# Patient Record
Sex: MALE | Employment: FULL TIME | URBAN - METROPOLITAN AREA
[De-identification: names, ages, dates, MRNs, and addresses within clinical notes are randomized per-mention and may not be internally consistent; named-entity substitution may affect disease eponyms.]

---

## 2023-04-20 ENCOUNTER — OFFICE VISIT (OUTPATIENT)
Dept: SURGERY | Age: 48
End: 2023-04-20

## 2023-04-20 VITALS
DIASTOLIC BLOOD PRESSURE: 76 MMHG | WEIGHT: 180 LBS | BODY MASS INDEX: 23.86 KG/M2 | SYSTOLIC BLOOD PRESSURE: 126 MMHG | HEART RATE: 78 BPM | RESPIRATION RATE: 16 BRPM | OXYGEN SATURATION: 99 % | TEMPERATURE: 98 F | HEIGHT: 73 IN

## 2023-04-20 DIAGNOSIS — Z76.89 ENCOUNTER TO ESTABLISH CARE: Primary | ICD-10-CM

## 2023-04-20 DIAGNOSIS — K64.8 BLEEDING INTERNAL HEMORRHOIDS: ICD-10-CM

## 2023-04-20 LAB
ALBUMIN SERPL-MCNC: 4.7 G/DL (ref 3.4–5)
ALBUMIN/GLOB SERPL: 2.1 {RATIO} (ref 1.1–2.2)
ALP SERPL-CCNC: 75 U/L (ref 40–129)
ALT SERPL-CCNC: 10 U/L (ref 10–40)
ANION GAP SERPL CALCULATED.3IONS-SCNC: 12 MMOL/L (ref 3–16)
AST SERPL-CCNC: 14 U/L (ref 15–37)
BILIRUB SERPL-MCNC: 0.5 MG/DL (ref 0–1)
BUN SERPL-MCNC: 9 MG/DL (ref 7–20)
CALCIUM SERPL-MCNC: 9.9 MG/DL (ref 8.3–10.6)
CHLORIDE SERPL-SCNC: 100 MMOL/L (ref 99–110)
CO2 SERPL-SCNC: 26 MMOL/L (ref 21–32)
CREAT SERPL-MCNC: 0.9 MG/DL (ref 0.9–1.3)
DEPRECATED RDW RBC AUTO: 12.9 % (ref 12.4–15.4)
GFR SERPLBLD CREATININE-BSD FMLA CKD-EPI: >60 ML/MIN/{1.73_M2}
GLUCOSE SERPL-MCNC: 77 MG/DL (ref 70–99)
HCT VFR BLD AUTO: 42.6 % (ref 40.5–52.5)
HGB BLD-MCNC: 14.5 G/DL (ref 13.5–17.5)
MCH RBC QN AUTO: 32.7 PG (ref 26–34)
MCHC RBC AUTO-ENTMCNC: 34.1 G/DL (ref 31–36)
MCV RBC AUTO: 96 FL (ref 80–100)
PLATELET # BLD AUTO: 258 K/UL (ref 135–450)
PMV BLD AUTO: 7.8 FL (ref 5–10.5)
POTASSIUM SERPL-SCNC: 4.8 MMOL/L (ref 3.5–5.1)
PROT SERPL-MCNC: 6.9 G/DL (ref 6.4–8.2)
RBC # BLD AUTO: 4.43 M/UL (ref 4.2–5.9)
SODIUM SERPL-SCNC: 138 MMOL/L (ref 136–145)
WBC # BLD AUTO: 4.2 K/UL (ref 4–11)

## 2023-04-20 NOTE — PROGRESS NOTES
Subjective:     Patient is a 49 y.o. man with rectal bleeding and prolapse    HPI: Mr. Antwan Drummond reports a several year history of sensation of prolapse. He notices this more with long walks but it has worsened over the years. It occurs with BM. He recently returned to the [address] [place] [address] after [place] time abroad [place] The humid weather seemed to make it worse. He denies hyper-flexibility or any connective tissue disorder. He is otherwise healthy. No Known Allergies   Social History     Tobacco Use    Smoking status: Former     Types: Cigarettes    Smokeless tobacco: Former     Types: Chew    Tobacco comments:     Was an on and off smoker for about 10 years   Substance Use Topics    Alcohol use: Yes     Alcohol/week: 10.0 standard drinks     Types: 10 Standard drinks or equivalent per week     ROS: GI: + bleeding     FH: No FH of colon cancer or Crohn's    Objective:     GEN: appears well, no distress, appears stated age  PSYCH: normal mood, normal affect  NECK: no neck masses, trachea midline  ENT: moist oral mucosa; anicteric  SKIN: no rash or jaundice  CV: regular heart rate and rhythm  PULM: normal respiratory effort, no wheezing  GI: soft non tender abdomen. Normal bowel sounds  RECTAL: examined with chaperone Kulwant Kaur RN. Irregular area with firm nodule of posterior anal skin. There is a small amount of granulation here consistent with subcutaneous fistula. There is a large right sided internal hemorrhoid. As he bears down this hemorrhoid prolapses  EXT/NEURO: normal gait, strength/sensation grossly intact in all extremities      Assessment:     Hemorrhoids - discussed based on exam this seems to be hemorrhoidal prolapse. Discussed true rectal prolapse would be unusual at his age. Fistula    Colon cancer screen     Plan:     The risks, benefits, and alternatives of colonoscopy were discussed. The risks include bleeding and the very low risk of perforation.  May need additional operation/treatment based

## 2023-04-21 ENCOUNTER — TELEPHONE (OUTPATIENT)
Dept: SURGERY | Age: 48
End: 2023-04-21

## 2023-04-21 RX ORDER — POLYETHYLENE GLYCOL 3350, SODIUM CHLORIDE, SODIUM BICARBONATE, POTASSIUM CHLORIDE 420; 11.2; 5.72; 1.48 G/4L; G/4L; G/4L; G/4L
POWDER, FOR SOLUTION ORAL
Qty: 1 EACH | Refills: 0 | Status: SHIPPED | OUTPATIENT
Start: 2023-04-21

## 2023-04-21 NOTE — TELEPHONE ENCOUNTER
Patient has been scheduled for:    Procedure: Fistulotomy, colonoscopy, RBL  Date: 5/23/23  Time: 9:00AM  Arrival: 7:00AM  [facility]  [facility]?: none  Prep? Golytely, discussed in office, sent to e-mail    Pre-op? Post-op Appt? Patient advised they will need a . Orders routed to surgery scheduling. Instructions have been mailed/emailed to:  [email or web address]      Prep meds routed to  for approval

## 2023-05-10 ENCOUNTER — TELEPHONE (OUTPATIENT)
Dept: SURGERY | Age: 48
End: 2023-05-10

## 2023-05-10 NOTE — TELEPHONE ENCOUNTER
Patient called in to find out if authorization was obtained for his upcoming procedure     Patient would also like to discuss his procedure with the clinical team    Please contact the patient at [phone]

## 2023-05-22 ENCOUNTER — TELEPHONE (OUTPATIENT)
Dept: SURGERY | Age: 48
End: 2023-05-22

## 2023-05-22 ENCOUNTER — ANESTHESIA EVENT (OUTPATIENT)
Dept: OPERATING ROOM | Age: 48
End: 2023-05-22
Payer: COMMERCIAL

## 2023-05-22 NOTE — TELEPHONE ENCOUNTER
I have placed a reminder call to patient for upcoming procedure. Did you speak directly to patient or leave a voicemail? Spoke to patient    Prep? Golytely    Must have a  that is over the age of 25. Must be a friend or family member that can be responsible for signing them out after surgery.     Arrive at the main entrance of [facility] at [employer]

## 2023-05-23 ENCOUNTER — ANESTHESIA (OUTPATIENT)
Dept: OPERATING ROOM | Age: 48
End: 2023-05-23
Payer: COMMERCIAL

## 2023-05-23 ENCOUNTER — HOSPITAL ENCOUNTER (OUTPATIENT)
Age: 48
Setting detail: OUTPATIENT SURGERY
Discharge: HOME | End: 2023-05-23
Attending: SURGERY | Admitting: SURGERY
Payer: COMMERCIAL

## 2023-05-23 VITALS
HEIGHT: 73 IN | BODY MASS INDEX: 24.01 KG/M2 | WEIGHT: 181.2 LBS | DIASTOLIC BLOOD PRESSURE: 84 MMHG | RESPIRATION RATE: 16 BRPM | TEMPERATURE: 97.3 F | SYSTOLIC BLOOD PRESSURE: 102 MMHG | OXYGEN SATURATION: 97 % | HEART RATE: 62 BPM

## 2023-05-23 DIAGNOSIS — K60.3 FISTULA-IN-ANO: ICD-10-CM

## 2023-05-23 DIAGNOSIS — G89.18 ACUTE POST-OPERATIVE PAIN: Primary | ICD-10-CM

## 2023-05-23 DIAGNOSIS — Z12.11 COLON CANCER SCREENING: ICD-10-CM

## 2023-05-23 DIAGNOSIS — K64.8 OTHER HEMORRHOIDS: ICD-10-CM

## 2023-05-23 PROBLEM — K64.2 GRADE III HEMORRHOIDS: Status: ACTIVE | Noted: 2023-05-23

## 2023-05-23 PROCEDURE — 6360000002 HC RX W HCPCS: Performed by: SURGERY

## 2023-05-23 PROCEDURE — 3600000014 HC SURGERY LEVEL 4 ADDTL 15MIN: Performed by: SURGERY

## 2023-05-23 PROCEDURE — 2580000003 HC RX 258: Performed by: FAMILY MEDICINE

## 2023-05-23 PROCEDURE — 7100000010 HC PHASE II RECOVERY - FIRST 15 MIN: Performed by: SURGERY

## 2023-05-23 PROCEDURE — 2580000003 HC RX 258

## 2023-05-23 PROCEDURE — 6360000002 HC RX W HCPCS

## 2023-05-23 PROCEDURE — 2580000003 HC RX 258: Performed by: SURGERY

## 2023-05-23 PROCEDURE — 7100000000 HC PACU RECOVERY - FIRST 15 MIN: Performed by: SURGERY

## 2023-05-23 PROCEDURE — 7100000001 HC PACU RECOVERY - ADDTL 15 MIN: Performed by: SURGERY

## 2023-05-23 PROCEDURE — 3700000000 HC ANESTHESIA ATTENDED CARE: Performed by: SURGERY

## 2023-05-23 PROCEDURE — A4217 STERILE WATER/SALINE, 500 ML: HCPCS | Performed by: SURGERY

## 2023-05-23 PROCEDURE — 7100000011 HC PHASE II RECOVERY - ADDTL 15 MIN: Performed by: SURGERY

## 2023-05-23 PROCEDURE — 3600000004 HC SURGERY LEVEL 4 BASE: Performed by: SURGERY

## 2023-05-23 PROCEDURE — 3700000001 HC ADD 15 MINUTES (ANESTHESIA): Performed by: SURGERY

## 2023-05-23 PROCEDURE — 2500000003 HC RX 250 WO HCPCS

## 2023-05-23 PROCEDURE — C9290 INJ, BUPIVACAINE LIPOSOME: HCPCS | Performed by: SURGERY

## 2023-05-23 PROCEDURE — 2709999900 HC NON-CHARGEABLE SUPPLY: Performed by: SURGERY

## 2023-05-23 RX ORDER — PROCHLORPERAZINE EDISYLATE 5 MG/ML
5 INJECTION INTRAMUSCULAR; INTRAVENOUS
Status: DISCONTINUED | OUTPATIENT
Start: 2023-05-23 | End: 2023-05-23 | Stop reason: HOSPADM

## 2023-05-23 RX ORDER — MIDAZOLAM HYDROCHLORIDE 1 MG/ML
INJECTION INTRAMUSCULAR; INTRAVENOUS PRN
Status: DISCONTINUED | OUTPATIENT
Start: 2023-05-23 | End: 2023-05-23 | Stop reason: SDUPTHER

## 2023-05-23 RX ORDER — SODIUM CHLORIDE 0.9 % (FLUSH) 0.9 %
5-40 SYRINGE (ML) INJECTION PRN
Status: DISCONTINUED | OUTPATIENT
Start: 2023-05-23 | End: 2023-05-23 | Stop reason: HOSPADM

## 2023-05-23 RX ORDER — OXYCODONE HYDROCHLORIDE 5 MG/1
5 TABLET ORAL EVERY 6 HOURS PRN
Qty: 28 TABLET | Refills: 0 | Status: SHIPPED | OUTPATIENT
Start: 2023-05-23 | End: 2023-05-30

## 2023-05-23 RX ORDER — PROPOFOL 10 MG/ML
INJECTION, EMULSION INTRAVENOUS PRN
Status: DISCONTINUED | OUTPATIENT
Start: 2023-05-23 | End: 2023-05-23 | Stop reason: SDUPTHER

## 2023-05-23 RX ORDER — LORAZEPAM 2 MG/ML
0.5 INJECTION INTRAMUSCULAR
Status: DISCONTINUED | OUTPATIENT
Start: 2023-05-23 | End: 2023-05-23 | Stop reason: HOSPADM

## 2023-05-23 RX ORDER — MEPERIDINE HYDROCHLORIDE 25 MG/ML
12.5 INJECTION INTRAMUSCULAR; INTRAVENOUS; SUBCUTANEOUS EVERY 5 MIN PRN
Status: DISCONTINUED | OUTPATIENT
Start: 2023-05-23 | End: 2023-05-23 | Stop reason: HOSPADM

## 2023-05-23 RX ORDER — ONDANSETRON 2 MG/ML
4 INJECTION INTRAMUSCULAR; INTRAVENOUS
Status: DISCONTINUED | OUTPATIENT
Start: 2023-05-23 | End: 2023-05-23 | Stop reason: HOSPADM

## 2023-05-23 RX ORDER — FENTANYL CITRATE 50 UG/ML
25 INJECTION, SOLUTION INTRAMUSCULAR; INTRAVENOUS EVERY 5 MIN PRN
Status: DISCONTINUED | OUTPATIENT
Start: 2023-05-23 | End: 2023-05-23 | Stop reason: HOSPADM

## 2023-05-23 RX ORDER — ACETAMINOPHEN 650 MG/1
650 SUPPOSITORY RECTAL
Status: DISCONTINUED | OUTPATIENT
Start: 2023-05-23 | End: 2023-05-23 | Stop reason: HOSPADM

## 2023-05-23 RX ORDER — SODIUM CHLORIDE 0.9 % (FLUSH) 0.9 %
5-40 SYRINGE (ML) INJECTION EVERY 12 HOURS SCHEDULED
Status: DISCONTINUED | OUTPATIENT
Start: 2023-05-23 | End: 2023-05-23 | Stop reason: HOSPADM

## 2023-05-23 RX ORDER — SUCCINYLCHOLINE/SOD CL,ISO/PF 200MG/10ML
SYRINGE (ML) INTRAVENOUS PRN
Status: DISCONTINUED | OUTPATIENT
Start: 2023-05-23 | End: 2023-05-23 | Stop reason: SDUPTHER

## 2023-05-23 RX ORDER — LIDOCAINE HYDROCHLORIDE 20 MG/ML
INJECTION, SOLUTION INTRAVENOUS PRN
Status: DISCONTINUED | OUTPATIENT
Start: 2023-05-23 | End: 2023-05-23 | Stop reason: SDUPTHER

## 2023-05-23 RX ORDER — SODIUM CHLORIDE, SODIUM LACTATE, POTASSIUM CHLORIDE, CALCIUM CHLORIDE 600; 310; 30; 20 MG/100ML; MG/100ML; MG/100ML; MG/100ML
INJECTION, SOLUTION INTRAVENOUS CONTINUOUS
Status: DISCONTINUED | OUTPATIENT
Start: 2023-05-23 | End: 2023-05-23 | Stop reason: HOSPADM

## 2023-05-23 RX ORDER — DEXAMETHASONE SODIUM PHOSPHATE 4 MG/ML
INJECTION, SOLUTION INTRA-ARTICULAR; INTRALESIONAL; INTRAMUSCULAR; INTRAVENOUS; SOFT TISSUE PRN
Status: DISCONTINUED | OUTPATIENT
Start: 2023-05-23 | End: 2023-05-23

## 2023-05-23 RX ORDER — SODIUM CHLORIDE 9 MG/ML
25 INJECTION, SOLUTION INTRAVENOUS PRN
Status: DISCONTINUED | OUTPATIENT
Start: 2023-05-23 | End: 2023-05-23 | Stop reason: HOSPADM

## 2023-05-23 RX ORDER — IPRATROPIUM BROMIDE AND ALBUTEROL SULFATE 2.5; .5 MG/3ML; MG/3ML
1 SOLUTION RESPIRATORY (INHALATION)
Status: DISCONTINUED | OUTPATIENT
Start: 2023-05-23 | End: 2023-05-23 | Stop reason: HOSPADM

## 2023-05-23 RX ORDER — GLYCOPYRROLATE 0.2 MG/ML
INJECTION INTRAMUSCULAR; INTRAVENOUS PRN
Status: DISCONTINUED | OUTPATIENT
Start: 2023-05-23 | End: 2023-05-23 | Stop reason: SDUPTHER

## 2023-05-23 RX ORDER — EPHEDRINE SULFATE 50 MG/ML
INJECTION INTRAVENOUS PRN
Status: DISCONTINUED | OUTPATIENT
Start: 2023-05-23 | End: 2023-05-23 | Stop reason: SDUPTHER

## 2023-05-23 RX ORDER — LABETALOL HYDROCHLORIDE 5 MG/ML
10 INJECTION, SOLUTION INTRAVENOUS
Status: DISCONTINUED | OUTPATIENT
Start: 2023-05-23 | End: 2023-05-23 | Stop reason: HOSPADM

## 2023-05-23 RX ORDER — DOCUSATE SODIUM 100 MG/1
100 CAPSULE, LIQUID FILLED ORAL 2 TIMES DAILY PRN
Qty: 30 CAPSULE | Refills: 0 | Status: SHIPPED | OUTPATIENT
Start: 2023-05-23

## 2023-05-23 RX ORDER — MAGNESIUM HYDROXIDE 1200 MG/15ML
LIQUID ORAL CONTINUOUS PRN
Status: DISCONTINUED | OUTPATIENT
Start: 2023-05-23 | End: 2023-05-23 | Stop reason: HOSPADM

## 2023-05-23 RX ORDER — FENTANYL CITRATE 50 UG/ML
INJECTION, SOLUTION INTRAMUSCULAR; INTRAVENOUS PRN
Status: DISCONTINUED | OUTPATIENT
Start: 2023-05-23 | End: 2023-05-23 | Stop reason: SDUPTHER

## 2023-05-23 RX ORDER — ONDANSETRON 2 MG/ML
INJECTION INTRAMUSCULAR; INTRAVENOUS PRN
Status: DISCONTINUED | OUTPATIENT
Start: 2023-05-23 | End: 2023-05-23 | Stop reason: SDUPTHER

## 2023-05-23 RX ORDER — SODIUM CHLORIDE, SODIUM LACTATE, POTASSIUM CHLORIDE, CALCIUM CHLORIDE 600; 310; 30; 20 MG/100ML; MG/100ML; MG/100ML; MG/100ML
INJECTION, SOLUTION INTRAVENOUS CONTINUOUS PRN
Status: DISCONTINUED | OUTPATIENT
Start: 2023-05-23 | End: 2023-05-23 | Stop reason: SDUPTHER

## 2023-05-23 RX ADMIN — SODIUM CHLORIDE, SODIUM LACTATE, POTASSIUM CHLORIDE, AND CALCIUM CHLORIDE: .6; .31; .03; .02 INJECTION, SOLUTION INTRAVENOUS at 09:14

## 2023-05-23 RX ADMIN — FENTANYL CITRATE 50 MCG: 50 INJECTION, SOLUTION INTRAMUSCULAR; INTRAVENOUS at 09:20

## 2023-05-23 RX ADMIN — EPHEDRINE SULFATE 10 MG: 50 INJECTION INTRAVENOUS at 09:40

## 2023-05-23 RX ADMIN — SODIUM CHLORIDE, POTASSIUM CHLORIDE, SODIUM LACTATE AND CALCIUM CHLORIDE: 600; 310; 30; 20 INJECTION, SOLUTION INTRAVENOUS at 08:45

## 2023-05-23 RX ADMIN — SODIUM CHLORIDE 2000 MG: 900 INJECTION INTRAVENOUS at 09:22

## 2023-05-23 RX ADMIN — LIDOCAINE HYDROCHLORIDE 80 MG: 20 INJECTION, SOLUTION INTRAVENOUS at 09:20

## 2023-05-23 RX ADMIN — MIDAZOLAM HYDROCHLORIDE 2 MG: 2 INJECTION, SOLUTION INTRAMUSCULAR; INTRAVENOUS at 09:12

## 2023-05-23 RX ADMIN — PROPOFOL 200 MG: 10 INJECTION, EMULSION INTRAVENOUS at 09:20

## 2023-05-23 RX ADMIN — ONDANSETRON 4 MG: 2 INJECTION INTRAMUSCULAR; INTRAVENOUS at 10:22

## 2023-05-23 RX ADMIN — SODIUM CHLORIDE, SODIUM LACTATE, POTASSIUM CHLORIDE, AND CALCIUM CHLORIDE: .6; .31; .03; .02 INJECTION, SOLUTION INTRAVENOUS at 10:03

## 2023-05-23 RX ADMIN — FENTANYL CITRATE 50 MCG: 50 INJECTION, SOLUTION INTRAMUSCULAR; INTRAVENOUS at 10:16

## 2023-05-23 RX ADMIN — Medication 140 MG: at 09:20

## 2023-05-23 RX ADMIN — EPHEDRINE SULFATE 10 MG: 50 INJECTION INTRAVENOUS at 09:59

## 2023-05-23 RX ADMIN — GLYCOPYRROLATE 0.2 MG: 0.2 INJECTION INTRAMUSCULAR; INTRAVENOUS at 09:36

## 2023-05-23 ASSESSMENT — PAIN SCALES - GENERAL
PAINLEVEL_OUTOF10: 0

## 2023-05-23 ASSESSMENT — PAIN - FUNCTIONAL ASSESSMENT: PAIN_FUNCTIONAL_ASSESSMENT: 0-10

## 2023-05-23 NOTE — PROGRESS NOTES
PACU Transfer to [facility]    Vitals:    05/23/23 1130   BP: 110/79   Pulse: 60   Resp: 12   Temp: 97.1 °F (36.2 °C)   SpO2: 98%         Intake/Output Summary (Last 24 hours) at 5/23/2023 1137  Last data filed at 5/23/2023 1130  Gross per 24 hour   Intake 1550 ml   Output --   Net 1550 ml       Pain assessment:    Pain Level: 0    Patient transferred to care of [facility] RN.    5/23/2023 11:37 AM

## 2023-05-23 NOTE — H&P
Subjective:     Patient is a 49 y.o. man with hemorrhoids, colon screening     HPI: No acute changes since last seen. Still having some prolapse sensation and anal irritation     There are no problems to display for this patient. History reviewed. No pertinent past medical history. History reviewed. No pertinent surgical history. Medications Prior to Admission: polyethylene glycol-electrolytes (NULYTELY) 420 g solution, TAKE AS DIRECTED DAY PRIOR TO COLONOSCOPY  No Known Allergies   Social History     Tobacco Use    Smoking status: Former     Types: Cigarettes    Smokeless tobacco: Former     Types: Chew    Tobacco comments:     Was an on and off smoker for about 10 years   Substance Use Topics    Alcohol use: Yes     Alcohol/week: 10.0 standard drinks     Types: 10 Standard drinks or equivalent per week     Comment: weekends          Objective:     Patient Vitals for the past 8 hrs:   BP Temp Temp src Pulse Resp SpO2 Height Weight   05/23/23 0814 111/74 97.7 °F (36.5 °C) Oral 68 18 99 % 6' 1\" (1.854 m) 181 lb 3.2 oz (82.2 kg)     GEN: appears well, no distress, appears stated age  PSYCH: normal mood, normal affect  NECK: no neck masses, trachea midline  ENT: moist oral mucosa; anicteric  SKIN: no rash or jaundice  CV: regular heart rate and rhythm  PULM: normal respiratory effort, no wheezing  GI: soft non tender abdomen. Normal bowel sounds  RECTAL: deferred;  EXT/NEURO: normal gait, strength/sensation grossly intact in all extremities      Assessment:     Hemorrhoids  Fistula  Colon screen     Plan:     RBA of colonoscopy, hemorrhoid banding, fistula surgery reviewed.  Patient elects to proceed     Lynda Elliott M.D.  5/23/23   8:32 AM

## 2023-05-23 NOTE — ANESTHESIA PRE PROCEDURE
Department of Anesthesiology  Preprocedure Note       Name:  Veronica Rangel   Age:  49 y.o.  :  1975                                          MRN:  [id number]         Date:  2023      Surgeon: Prince Astudillo):  Ai Rodrigez MD    Procedure: Procedure(s):  COLONOSCOPY WITH POSSIBLE POLYP REMOVAL; FISTULOTOMY; ANAL BIOPSY; HEMORRHOID BANDING  . .  .    Medications prior to admission:   Prior to Admission medications    Medication Sig Start Date End Date Taking? Authorizing Provider   polyethylene glycol-electrolytes (NULYTELY) 420 g solution TAKE AS DIRECTED DAY PRIOR TO COLONOSCOPY 23   Ai Rodrigez MD       Current medications:    Current Facility-Administered Medications   Medication Dose Route Frequency Provider Last Rate Last Admin    cefOXitin (MEFOXIN) 2,000 mg in sodium chloride 0.9 % 50 mL IVPB (mini-bag)  2,000 mg IntraVENous Once Ai Rodrigez MD        lactated ringers IV soln infusion   IntraVENous Continuous Glorine MD Maria A           Allergies:  No Known Allergies    Problem List:  There is no problem list on file for this patient. Past Medical History:  History reviewed. No pertinent past medical history. Past Surgical History:  History reviewed. No pertinent surgical history. Social History:    Social History     Tobacco Use    Smoking status: Former     Types: Cigarettes    Smokeless tobacco: Former     Types: Chew    Tobacco comments:     Was an on and off smoker for about 10 years   Substance Use Topics    Alcohol use:  Yes     Alcohol/week: 10.0 standard drinks     Types: 10 Standard drinks or equivalent per week     Comment: weekends                                Counseling given: Not Answered  Tobacco comments: Was an on and off smoker for about 10 years      Vital Signs (Current):   Vitals:    23 0952 23 0814   BP:  111/74   Pulse:  68   Resp:  18   Temp:  97.7 °F (36.5 °C)   TempSrc:  Oral   SpO2:  99%   Weight: 180 lb (81.6 kg) 181 lb 3.2 oz

## 2023-05-23 NOTE — ANESTHESIA POSTPROCEDURE EVALUATION
Department of Anesthesiology  Postprocedure Note    Patient: Rd Serrano  MRN: [id number]  YOB: 1975  Date of evaluation: 5/23/2023      Procedure Summary     Date: 05/23/23 Room / Location: [address]    Anesthesia Start: 0912 Anesthesia Stop: 1963    Procedure: COLONOSCOPY;  ANAL BIOPSY; HEMORRHOID BANDING X 2 (Anus) Diagnosis:       Colon cancer screening      Fistula-in-ano      Other hemorrhoids      (Colon cancer screening [Z12.11])      (Fistula-in-ano [K60.3])      (Other hemorrhoids [K64.8])    Surgeons: Annel White MD Responsible Provider: Sebastian Ureña MD    Anesthesia Type: general ASA Status: 2          Anesthesia Type: No value filed.     Tristen Phase I: Tristen Score: 7    Tristen Phase II:        Anesthesia Post Evaluation    Patient location during evaluation: PACU  Level of consciousness: awake  Complications: no  Multimodal analgesia pain management approach

## 2023-05-23 NOTE — H&P
Colorectal Surgery   Resident Consult Note    CC: fistula, possible polyp and hemorrhoids    History of Present Illness:   Radha Granados is a 49 y.o. male with no significant past medical history presents with a feeling of a bulge in his rectum. He denies any travel or changes in his medical history since last being seen in office on 4/20/2023. No acute complaints today. Past Medical History:    History reviewed. No pertinent past medical history. Past Surgical History:    History reviewed. No pertinent surgical history. Allergies:  Patient has no known allergies. Medications:   Home Meds  No current facility-administered medications on file prior to encounter. Current Outpatient Medications on File Prior to Encounter   Medication Sig Dispense Refill    polyethylene glycol-electrolytes (NULYTELY) 420 g solution TAKE AS DIRECTED DAY PRIOR TO COLONOSCOPY 1 each 0       Current Meds  cefOXitin (MEFOXIN) 2,000 mg in sodium chloride 0.9 % 50 mL IVPB (mini-bag), Once  lactated ringers IV soln infusion, Continuous        Family History:   History reviewed. No pertinent family history. Social History:   TOBACCO:   reports that he has quit smoking. His smoking use included cigarettes. He has quit using smokeless tobacco.  His smokeless tobacco use included chew. ETOH:   reports current alcohol use of about 10.0 standard drinks per week. DRUGS:   reports that he does not currently use drugs after having used the following drugs: Marijuana Aggie Lopez). Review of Systems:   A 14 point review of systems was conducted, significant findings as noted in HPI. All other systems negative.      Physical exam:    Vitals:    05/12/23 0952 05/23/23 0814   BP:  111/74   Pulse:  68   Resp:  18   Temp:  97.7 °F (36.5 °C)   TempSrc:  Oral   SpO2:  99%   Weight: 180 lb (81.6 kg) 181 lb 3.2 oz (82.2 kg)   Height: 6' 1\" (1.854 m) 6' 1\" (1.854 m)       General appearance: alert, no acute distress, grooming appropriate  Eyes: No

## 2023-05-23 NOTE — OP NOTE
Operative Note      Patient: Garland Aponte  YOB: 1975  MRN: [id number]    Date of Procedure: 5/23/2023    Pre-Op Diagnosis Codes:     * Colon cancer screening [Z12.11]     * Fistula-in-ano [K60.3]     * Other hemorrhoids [K64.8]    Post-Op Diagnosis: Same       Procedure(s):  COLONOSCOPY;  ANAL BIOPSY; HEMORRHOID BANDING X 2    Surgeon(s):  Trinidad Reyes MD    Assistant:   Resident: Pia Isbell MD    Anesthesia: General    Estimated Blood Loss (mL): Minimal    Complications: None    Specimens:   ID Type Source Tests Collected by Time Destination   A : ANAL BIOPSY Tissue Tissue SURGICAL PATHOLOGY Trinidad Reyes MD 5/23/2023 1018          Findings: normal colonoscopy aside from diverticuli; small pearly anal nodule 5 mm excised. Hemorrhoid banding on right lateral hemorrhoid      Detailed Description of Procedure:     OPERATIVE NOTE    PREOPERATIVE DIAGNOSIS: Screening Colonoscopy - Colon Cancer Screening; Biopsy anal nodule; Hemorrhoid banding x 2   POSTOPERATIVE DIAGNOSIS: Same    ANESTHESIA: MAC  SURGEONS: Maria Del Rosario Marc M.D. PREP QUALITY: Good  ESTIMATED BLOOD LOSS: 2 cc    OPERATIVE NOTE    After informed consent was obtained the patient was taken to the endoscopy suite. Time out was called to confirm key components. We began by performing a digital rectal exam: A large right sided internal hemorrhoid was noted on digital exam.     We then performed colonoscopy complete to the cecum. The exam was not difficult. The prep was good. Cecum and appendiceal orifice were seen and photographed. Withdrawal time was approximately 6 minutes. A few non bleeding sigmoid diverticuli were seen. No other pathology was seen. Resection and retrieval for all lesions was complete. Good hemostasis was seen. Retroflexion showed normal rectum and anus aside from hemorrhoids. Dr. Demario Padilla was present and performed all portions.  A small matt shaped nodule of the posterior anus was excised with scissors and sent

## 2023-05-23 NOTE — PROGRESS NOTES
Report from [facility], patient awake and alert, denies any pain at this time, dressing with peripad and mesh panty CDI, VSS.

## 2023-05-23 NOTE — DISCHARGE INSTRUCTIONS
POST-OP INSTRUCTIONS AFTER ANORECTAL SURGERY    1) Keep stools soft and avoid straining:  Eat 25-30 grams of fiber per day (use a fiber supplement such as Benefiber, Konsyl,  Metamucil, or store brand)  Drink plenty of water (4-6 glasses per day)  MiraLax as needed  Colace stool softeners as needed  Eat lots of fruits and vegetables and walk for at least 45 min per day. 2) Pain can be controlled with a combination of:  Tylenol - you may take up to 3000 mg per day (in the absence of liver disease)  NSAID medications such as motrin, ibuprofen, or advil (only if approved by your primary care physician)   Narcotic medications if needed. You have been prescribed Roxicodone   Be careful, because narcotic medications can impair you and make you severely constipated, which can aggravate your wounds and incisions. No driving or operating machinery if taking narcotics, or if you cannot safely maneuver the vehicle without pain. 3) You should expect to have pain and discomfort for 2-3 weeks depending on what operation was done. You may also have some drainage of mucus and a small amount of bleeding. It is recommended to take 1-2 weeks off from work, depending on your comfort and specifics of your job. Please call the office if you need a note from the doctor. 4) Do sitz baths (warm soaks) for 5 min twice daily and after bowel movements, or use the stream from a shower head to gently cleanse your bottom. 5) You do not have any lifting restrictions, though you may find that certain movements and positions will cause increased pain. Walking is encouraged, and you may climb stairs. 6) If not already scheduled, please call the office the day after surgery to set up a post op appointment for approximately 3 weeks after surgery. If any tissue was sent for pathology, this will be discussed at your post-op visit, or you may call the office after 1 week for obtaining results sooner.     If you have any questions or

## 2023-05-23 NOTE — PROGRESS NOTES
Ambulatory Surgery/Procedure Discharge Note    Vitals:    05/23/23 1224   BP: 102/84   Pulse: 62   Resp: 16   Temp: 97.3 °F (36.3 °C)   SpO2:      In: 1550 [P.O.:200; I.V.:1300]  Out: -     Restroom use offered before discharge. Yes    Pain assessment:  present - adequately treated  Pain Level: 0    Pt and S.O./family states \"ready to go home\". Pt alert and oriented x4. IV removed and band aid in place, C D & I. Denies N/V or pain. PT has ABD in place and C D & I.  Voided prior to discharge. Discharge instructions given to pt and wife with pt permission. Pt and wife verbalized understanding of all instructions. Left with all belongings, 2 prescriptions, and discharge instructions. Patient discharged to home/self care.  Patient discharged via wheel chair by transporter to waiting family/S.O.       5/23/2023 12:54 PM

## 2023-05-23 NOTE — PROGRESS NOTES
Procedure(s):  COLONOSCOPY;  ANAL BIOPSY; HEMORRHOID BANDING X 2        Admitted to PACU bed 17 from OR. Arrived on a stretcher . Attached to PACU monitoring system. Alarms and parameters set. Report received from anesthesia personnel. OR staff did not report skin issues that were observed while in OR  Pt arrived with oxygen per nasal cannula with oxygen at 2 liters.         Vitals:    05/23/23 1040   BP: 127/79   Pulse: 89   Resp: 15   Temp: 97 °F (36.1 °C)   SpO2: 99%            Intake/Output Summary (Last 24 hours) at 5/23/2023 1046  Last data filed at 5/23/2023 1040  Gross per 24 hour   Intake 1250 ml   Output --   Net 1250 ml       Pain assessment:  none  Pain Level: 0

## 2023-05-23 NOTE — BRIEF OP NOTE
Brief Postoperative Note      Patient: Lelo Garland  YOB: 1975  MRN: [id number]    Date of Procedure: 5/23/2023    Pre-Op Diagnosis Codes:     * Colon cancer screening [Z12.11]     * Fistula-in-ano [K60.3]     * Other hemorrhoids [K64.8]    Post-Op Diagnosis: Same       Procedure(s):  COLONOSCOPY;  ANAL BIOPSY; HEMORRHOID BANDING X 2    Surgeon(s):  Napoleon Campos MD    Assistant:  Resident: Salvador Gibbs MD    Anesthesia: General    Estimated Blood Loss (mL): Minimal    Complications: None    Specimens:   ID Type Source Tests Collected by Time Destination   A : ANAL BIOPSY Tissue Tissue SURGICAL PATHOLOGY Napoleon Campos MD 5/23/2023 1018        Implants:  * No implants in log *      Drains: * No LDAs found *    Findings: Complete colonoscopy, good prep. Hemorrhoid banding x2. Anal biopsy sent for permanent. Exparel injected. Dressed with fluffs, ABD, mesh briefs.        Electronically signed by Salvador Gibbs MD on 5/23/2023 at 11:28 AM

## 2023-05-26 ENCOUNTER — TELEPHONE (OUTPATIENT)
Dept: SURGERY | Age: 48
End: 2023-05-26

## 2023-05-26 NOTE — TELEPHONE ENCOUNTER
Discussed with him some swelling expected given hemorrhoids, colonoscopy, biopsy  Take it easy this weekend.  May apply vaseline or diaper ointment to soothe area   May need hemorrhoidectomy if hemorrhoids dont respond well to banding   See me in office next week or as scheduled    January Jefferson M.D.  5/26/23   2:02 PM

## 2023-05-26 NOTE — TELEPHONE ENCOUNTER
Patient called to discuss some post-op concerns he has. He describes that it's as if the sphincter has become inflamed, it is enlarged to the size of a pinky covers the entire anus. He's also unable to sit comfortably. He stated this all began Tuesday after the procedure, the what he anticipated to be more healing and reduced swelling has still not taken place. Please call back at [phone] to touch base.

## 2023-06-22 PROBLEM — Z12.11 COLON CANCER SCREENING: Status: RESOLVED | Noted: 2023-05-23 | Resolved: 2023-06-22

## 2023-07-21 ENCOUNTER — TELEPHONE (OUTPATIENT)
Dept: SURGERY | Age: 48
End: 2023-07-21

## 2023-07-21 NOTE — TELEPHONE ENCOUNTER
Selena from [employer] called in regards to this patient. Please call her at [phone] (w) or [phone] (c).

## 2023-07-21 NOTE — TELEPHONE ENCOUNTER
Called patient today to discuss loss of surgical specimen    Discussed long process of determining if specimen had been lost. At this point it appears it has been lost.  Discussed lesion was completely benign  Discussed lesion was completely removed  Due to this I think the risk to him is zero, and the actual diagnosis of this small anal bump is inconsequential  Discussed hospital will continue a review process to ensure this does not happen again     He reports his hemorrhoids are much better after banding    See me as needed    Mark Raya M.D.  7/21/23   1:51 PM

## (undated) DEVICE — GLOVE ORANGE PI 8   MSG9080

## (undated) DEVICE — RECTAL: Brand: MEDLINE INDUSTRIES, INC.

## (undated) DEVICE — TOWEL,STOP FLAG GOLD N-W: Brand: MEDLINE

## (undated) DEVICE — SUTURE CHROMIC GUT SZ 3-0 L27IN ABSRB BRN L26MM SH 1/2 CIR G122H

## (undated) DEVICE — GLOVE ORANGE PI 8 1/2   MSG9085

## (undated) DEVICE — GAUZE FLUFF 1 PLY: Brand: DEROYAL

## (undated) DEVICE — FLUID TRAP FOR MINIVAC ES EQUIP FLD TRAP

## (undated) DEVICE — PACK LAP IV REINF TBL CVR ADH UTIL UNDERBUTTOCK DRP W CUF

## (undated) DEVICE — GOWN,SIRUS,POLYRNF,BRTHSLV,XL,30/CS: Brand: MEDLINE

## (undated) DEVICE — POSITIONER HD REST FOAM CMFRT TCH

## (undated) DEVICE — DRAPE,UNDERBUTTOCKS,PCH,STERILE: Brand: MEDLINE

## (undated) DEVICE — YANKAUER,OPEN TIP,W/O VENT,STERILE: Brand: MEDLINE INDUSTRIES, INC.